# Patient Record
Sex: MALE | Race: ASIAN
[De-identification: names, ages, dates, MRNs, and addresses within clinical notes are randomized per-mention and may not be internally consistent; named-entity substitution may affect disease eponyms.]

---

## 2018-02-04 ENCOUNTER — HOSPITAL ENCOUNTER (EMERGENCY)
Dept: HOSPITAL 25 - ED | Age: 23
Discharge: HOME | End: 2018-02-04
Payer: COMMERCIAL

## 2018-02-04 VITALS — DIASTOLIC BLOOD PRESSURE: 73 MMHG | SYSTOLIC BLOOD PRESSURE: 116 MMHG

## 2018-02-04 DIAGNOSIS — F10.129: Primary | ICD-10-CM

## 2018-02-04 PROCEDURE — 99282 EMERGENCY DEPT VISIT SF MDM: CPT

## 2018-02-06 NOTE — ED
Frederick HUERTAS Tiffany, scribed for Lloyd Estrada MD on 02/04/18 at 0407 .





Substance Abuse/Use





- HPI Summary


HPI Summary: 


This patient is a 22 year old M BIBA to [facility] with a chief complaint of ETOH 

intoxication since minutes ago. Per EMS, patient's friends called EMS. Symptoms 

aggravated by nothing. Symptoms alleviated by nothing.





LEVEL 5 CAVEAT: HPI is limited due to ETOH intoxication.





- History Of Current Complaint


Chief Complaint: EDSubstanceAbuse


Stated Complaint: ETOH


Hx Obtained From: EMS


Onset/Duration  of Drug/ETOH Abuse: Minutes


Aggravating Factor(s): Nothing


Alleviating Factor(s): Nothing





PMH/Surg Hx/FS Hx/Imm Hx


Previously Healthy: Yes - LEVEL 5 CAVEAT: PMHx is limited due to ETOH 

intoxication


Infectious Disease History: Unable to Obtain/Confirm


Infectious Disease History: 


   Denies: Traveled Outside the [place] in Last 30 Days





- Social History


Alcohol Use: unknown


Substance Use Type: Reports: Other


Smoking Status (MU): Unknown if Ever Smoked





Review of Systems





- ROS Summary


Review of Systems Summary: 





unable to elicit due to etoh intoxication


Negative: Fever


Positive: Other - ETOH intoxication


All Other Systems Reviewed And Are Negative: Yes





Physical Exam





- Summary


Physical Exam Summary: 


Appearance: Intoxicated


Skin: Warm


Eyes: Normal


ENT: Normal


Neck: Supple, nontender


Respiratory: Clear to auscultation


Cardiovascular: Normal S1, S2. No murmurs. Normal distal pulses in tibial and 

radial bilaterally.


Abdomen: Soft, nontender


Musculoskeletal: Normal, Strength/ROM Intact


Neurological: Pupils are 3mm and equal


Psychiatric: Normal


Triage Information Reviewed: Yes


Vital Signs On Initial Exam: 


 Initial Vitals











Temp Pulse Resp BP Pulse Ox


 


 96.9 F   79   20   103/64   100 


 


 02/04/18 02:40  02/04/18 02:40  02/04/18 02:40  02/04/18 02:40  02/04/18 02:40











Vital Signs Reviewed: Yes





Diagnostics





- Vital Signs


 Vital Signs











  Temp Pulse Resp BP Pulse Ox


 


 02/04/18 03:30   87   100/56  100


 


 02/04/18 03:20   85    100


 


 02/04/18 02:40  96.9 F  79  20  103/64  100














- Laboratory


Lab Statement: Any lab studies that have been ordered have been reviewed, and 

results considered in the medical decision making process.





Course/Dx





- Course


Assessment/Plan: exam drastically improved after observation, instructed to 

refrain from etoh use. ambulating and conversational.





- Diagnoses


Provider Diagnoses: 


 Alcohol intoxication








Discharge





- Discharge Plan


Condition: Stable


Disposition: HOME


Patient Education Materials:  Alcohol Intoxication (ED)


Referrals: 


[facility] - Medardo GARCIA [Primary Care Provider] - 


Additional Instructions: 


PLEASE MAKE AN APPOINTMENT TO SEE YOUR PRIMARY CARE PROVIDER WITHIN 3-5 DAYS.





PLEASE RETURN TO THE ED TO IMMEDIATELY FOR WORSENING OR CONCERNING SYMPTOMS.





The documentation as recorded by the Frederick lopez Tiffany accurately reflects 

the service I personally performed and the decisions made by me, Lloyd Estrada MD.